# Patient Record
Sex: FEMALE | Race: WHITE | NOT HISPANIC OR LATINO
[De-identification: names, ages, dates, MRNs, and addresses within clinical notes are randomized per-mention and may not be internally consistent; named-entity substitution may affect disease eponyms.]

---

## 2020-07-08 PROBLEM — Z00.00 ENCOUNTER FOR PREVENTIVE HEALTH EXAMINATION: Noted: 2020-07-08

## 2020-07-09 ENCOUNTER — APPOINTMENT (OUTPATIENT)
Dept: THORACIC SURGERY | Facility: CLINIC | Age: 45
End: 2020-07-09
Payer: COMMERCIAL

## 2020-07-09 VITALS
SYSTOLIC BLOOD PRESSURE: 118 MMHG | HEIGHT: 61 IN | DIASTOLIC BLOOD PRESSURE: 76 MMHG | BODY MASS INDEX: 27.38 KG/M2 | WEIGHT: 145 LBS | HEART RATE: 74 BPM | OXYGEN SATURATION: 99 %

## 2020-07-09 DIAGNOSIS — K29.70 GASTRITIS, UNSPECIFIED, W/OUT BLEEDING: ICD-10-CM

## 2020-07-09 DIAGNOSIS — Z82.49 FAMILY HISTORY OF ISCHEMIC HEART DISEASE AND OTHER DISEASES OF THE CIRCULATORY SYSTEM: ICD-10-CM

## 2020-07-09 DIAGNOSIS — Z87.19 PERSONAL HISTORY OF OTHER DISEASES OF THE DIGESTIVE SYSTEM: ICD-10-CM

## 2020-07-09 PROCEDURE — 99205 OFFICE O/P NEW HI 60 MIN: CPT

## 2020-07-09 NOTE — HISTORY OF PRESENT ILLNESS
[FreeTextEntry1] : 44 y/o F with no significant pmhx who presents to our office for surgical evaluation of hiatal hernia repair. \par \par She initially presented to her ENT last month with cc of burning in throat and globus sensation in throat.  She states she remembers the moment it began on June 1st after swallowing a vitamin.  During her ENT visit she underwent laryngoscopy and was told she had LPR.  She was referred to GI for reflux managementt.  Her first GI physician had patient undergo  a barium esophagram which had normal results.  SHe followed up with a 2nd opinion and this physician performed an endoscopy on 6/26.  The endoscopy demonstrated a small hiatal hernia and some gastritis without signs of reflux and H.pylori was negative.  SHe was started on nexium and sucralfate at that time and has been on this medication for 2 weeks stating that it does not help at all. \par \par Patient sleeps sitting upright and is unable to lay flat without symptoms.  She also admits to some SOB,decreased appetite, weight loss (20 lbs over last month) and early satiety.  She denies dysphagia or difficulty swallowing.  She does complain of globus

## 2020-07-09 NOTE — PHYSICAL EXAM
[General Appearance - In No Acute Distress] : in no acute distress [General Appearance - Alert] : alert [Sclera] : the sclera and conjunctiva were normal [PERRL With Normal Accommodation] : pupils were equal in size, round, and reactive to light [Outer Ear] : the ears and nose were normal in appearance [Extraocular Movements] : extraocular movements were intact [Neck Cervical Mass (___cm)] : no neck mass was observed [Neck Appearance] : the appearance of the neck was normal [Oropharynx] : the oropharynx was normal [Thyroid Nodule] : there were no palpable thyroid nodules [Thyroid Diffuse Enlargement] : the thyroid was not enlarged [Jugular Venous Distention Increased] : there was no jugular-venous distention [Auscultation Breath Sounds / Voice Sounds] : lungs were clear to auscultation bilaterally [Heart Rate And Rhythm] : heart rate was normal and rhythm regular [Heart Sounds] : normal S1 and S2 [Murmurs] : no murmurs [Heart Sounds Gallop] : no gallops [Chest Visual Inspection Thoracic Asymmetry] : no chest asymmetry [Examination Of The Chest] : the chest was normal in appearance [Heart Sounds Pericardial Friction Rub] : no pericardial rub [Diminished Respiratory Excursion] : normal chest expansion [Abdomen Soft] : soft [Bowel Sounds] : normal bowel sounds [Abdomen Mass (___ Cm)] : no abdominal mass palpated [Abdomen Tenderness] : non-tender [Cervical Lymph Nodes Enlarged Posterior Bilaterally] : posterior cervical [Cervical Lymph Nodes Enlarged Anterior Bilaterally] : anterior cervical [Supraclavicular Lymph Nodes Enlarged Bilaterally] : supraclavicular [Axillary Lymph Nodes Enlarged Bilaterally] : axillary [Inguinal Lymph Nodes Enlarged Bilaterally] : inguinal [Femoral Lymph Nodes Enlarged Bilaterally] : femoral [No Spinal Tenderness] : no spinal tenderness [No CVA Tenderness] : no ~M costovertebral angle tenderness [Nail Clubbing] : no clubbing  or cyanosis of the fingernails [Abnormal Walk] : normal gait [Musculoskeletal - Swelling] : no joint swelling seen [Motor Tone] : muscle strength and tone were normal [Skin Color & Pigmentation] : normal skin color and pigmentation [Deep Tendon Reflexes (DTR)] : deep tendon reflexes were 2+ and symmetric [] : no rash [Skin Turgor] : normal skin turgor [No Focal Deficits] : no focal deficits [Sensation] : the sensory exam was normal to light touch and pinprick [Oriented To Time, Place, And Person] : oriented to person, place, and time [Affect] : the affect was normal [Impaired Insight] : insight and judgment were intact

## 2020-07-09 NOTE — CONSULT LETTER
[Dear  ___] : Dear  [unfilled], [Referral Letter:] : I am referring [unfilled] to you for further evaluation.  My most recent evaluation follows. [Consult Closing:] : Thank you very much for allowing me to participate in the care of this patient.  If you have any questions, please do not hesitate to contact me. [Please see my note below.] : Please see my note below. [Sincerely,] : Sincerely, [FreeTextEntry3] : Jona Chavez MD\par Thoracic Surgery\par [facility]

## 2020-07-09 NOTE — ASSESSMENT
[FreeTextEntry1] : 44 y/o F with 1 month history of significant globus sensation, throat burning, decreased appetite & 20 lb weight loss.  Both her endoscopy & barium esophagram demonstrate little indication for surgery however patient continues to complain of severe symptoms.  She has been instructed to undergo a Bravo pH study to help quantify her reflux.  We will also obtain the results of her initially laryngoscopy with Dr. Randolph (ENT).  Patient will need to return to our office to discuss these results and determine further plan for treatment going forward.

## 2020-07-09 NOTE — DATA REVIEWED
[FreeTextEntry1] : Barium Esophagram 6/19/2020: \par normal swallowing mechanism with normal primary peristaltic wave.  There is no esophageal mass or stricture.  No gastroesophageal reflux was visualized.  Limited examination of the stomach demonstrated no gross abnormality.  The barium pill passes promptly into the stomach without any abnormal delay.  \par \par Upper Endoscopy 6/26/20:\par Small hiatal hernia present.  Esophagus normal.  Mild inflammation characterized by erythema, friability and granularity was found in the gastric body and antrum. \par \par Pathology 6/26/20:  \par 1. small bowel: no significant histopathologic changes\par 2. gastri antrum:  mild reactive gastropathy. negative for H. Pylori,\par 3. gastric body: chronic gastritis\par 4. Esophagus:  no significant histopathologic changes\par

## 2020-07-15 ENCOUNTER — APPOINTMENT (OUTPATIENT)
Dept: GASTROENTEROLOGY | Facility: CLINIC | Age: 45
End: 2020-07-15
Payer: COMMERCIAL

## 2020-07-15 VITALS
HEART RATE: 97 BPM | HEIGHT: 61 IN | SYSTOLIC BLOOD PRESSURE: 108 MMHG | BODY MASS INDEX: 25.11 KG/M2 | DIASTOLIC BLOOD PRESSURE: 70 MMHG | WEIGHT: 133 LBS

## 2020-07-15 PROCEDURE — 99244 OFF/OP CNSLTJ NEW/EST MOD 40: CPT

## 2020-07-15 NOTE — ASSESSMENT
[FreeTextEntry1] : LPR with ?GERD- prior EGD and esophagram without clear evidence of GERD. Recommend EGD with Bravo pH capsule study to evaluate for acid reflux and association with symptoms. \par Risks/benefits/alternatives including not doing procedure discussed with patient. \par She understands and agrees to EGD with Bravo pH capsule placement.

## 2020-07-15 NOTE — PHYSICAL EXAM
[General Appearance - Alert] : alert [General Appearance - In No Acute Distress] : in no acute distress [Sclera] : the sclera and conjunctiva were normal [Outer Ear] : the ears and nose were normal in appearance [Hearing Threshold Finger Rub Not [place]] : hearing was normal [Neck Appearance] : the appearance of the neck was normal [] : no respiratory distress [Apical Impulse] : the apical impulse was normal [Abdomen Soft] : soft [Abdomen Tenderness] : non-tender [No CVA Tenderness] : no ~M costovertebral angle tenderness [Abnormal Walk] : normal gait [Skin Color & Pigmentation] : normal skin color and pigmentation [Cranial Nerves] : cranial nerves 2-12 were intact [Oriented To Time, Place, And Person] : oriented to person, place, and time [FreeTextEntry1] : deferred

## 2020-07-15 NOTE — HISTORY OF PRESENT ILLNESS
[FreeTextEntry1] : 46 yo f PMH ectopic pregnancy s/p removal of fallopian tube, seen at the request of Dr. Chavez for the evaluation of GERD. pH testing requested as part of evaluation prior to antireflux surgery. \par \par She saw ENT 1-2 months ago for throat burning and globus sensation which started acutely on June 1 after swallowing a vitamin.Patient has been seeing ENT for years and has apparently had LPR on prior evaluations as well. \par She has regurgitation and reflux after every meal as well as nausea. no vomiting. \par She was referred to GI. \par GI physician Dr. Knox, ordered esophagram which was normal and no GERD demonstrated. Barium pill promptly passed in to the stomach. \par  Dr. Hogan,  performed EGD on 6/26. small HH, gastritis, no signs of reflux, hp negative. , no EoE\par She started Nexium and sucralfate which has not been helpful at all with her symptoms. \par symptoms are worse when supine, she sleeps upright. \par She has some decreased appetite/early satiety and 20 lbs weight loss in last 1 -2 months. \par no dysphagia/odynophagia. \par + globus\par \par no abd pain. \par she denies NSAID use\par she is eating a bland diet\par she had recent labs in June with PMD, no anemia, cmet normal. \par no prior colonoscopy

## 2020-07-26 ENCOUNTER — RESULT REVIEW (OUTPATIENT)
Age: 45
End: 2020-07-26

## 2020-07-28 ENCOUNTER — RESULT REVIEW (OUTPATIENT)
Age: 45
End: 2020-07-28

## 2020-07-28 ENCOUNTER — APPOINTMENT (OUTPATIENT)
Dept: GASTROENTEROLOGY | Facility: HOSPITAL | Age: 45
End: 2020-07-28

## 2020-07-29 ENCOUNTER — RESULT REVIEW (OUTPATIENT)
Age: 45
End: 2020-07-29

## 2020-08-01 ENCOUNTER — RESULT REVIEW (OUTPATIENT)
Age: 45
End: 2020-08-01

## 2020-08-03 ENCOUNTER — APPOINTMENT (OUTPATIENT)
Dept: GASTROENTEROLOGY | Facility: HOSPITAL | Age: 45
End: 2020-08-03

## 2020-08-03 ENCOUNTER — RESULT REVIEW (OUTPATIENT)
Age: 45
End: 2020-08-03

## 2020-08-17 ENCOUNTER — TRANSCRIPTION ENCOUNTER (OUTPATIENT)
Age: 45
End: 2020-08-17

## 2020-10-22 ENCOUNTER — APPOINTMENT (OUTPATIENT)
Dept: THORACIC SURGERY | Facility: CLINIC | Age: 45
End: 2020-10-22
Payer: COMMERCIAL

## 2020-10-22 PROCEDURE — 99214 OFFICE O/P EST MOD 30 MIN: CPT

## 2020-10-22 NOTE — REVIEW OF SYSTEMS
[Shortness Of Breath] : shortness of breath [As Noted in HPI] : as noted in HPI [Heartburn] : heartburn [Negative] : Heme/Lymph

## 2020-10-23 NOTE — PHYSICAL EXAM
[Sclera] : the sclera and conjunctiva were normal [PERRL With Normal Accommodation] : pupils were equal in size, round, and reactive to light [Extraocular Movements] : extraocular movements were intact [Neck Appearance] : the appearance of the neck was normal [Neck Cervical Mass (___cm)] : no neck mass was observed [Jugular Venous Distention Increased] : there was no jugular-venous distention [Thyroid Diffuse Enlargement] : the thyroid was not enlarged [Thyroid Nodule] : there were no palpable thyroid nodules [Auscultation Breath Sounds / Voice Sounds] : lungs were clear to auscultation bilaterally [Heart Rate And Rhythm] : heart rate was normal and rhythm regular [Heart Sounds] : normal S1 and S2 [Heart Sounds Gallop] : no gallops [Murmurs] : no murmurs [Heart Sounds Pericardial Friction Rub] : no pericardial rub [Examination Of The Chest] : the chest was normal in appearance [Chest Visual Inspection Thoracic Asymmetry] : no chest asymmetry [Diminished Respiratory Excursion] : normal chest expansion [Bowel Sounds] : normal bowel sounds [Abdomen Soft] : soft [Abdomen Tenderness] : non-tender [Abdomen Mass (___ Cm)] : no abdominal mass palpated [Cervical Lymph Nodes Enlarged Posterior Bilaterally] : posterior cervical [Cervical Lymph Nodes Enlarged Anterior Bilaterally] : anterior cervical [Supraclavicular Lymph Nodes Enlarged Bilaterally] : supraclavicular [Axillary Lymph Nodes Enlarged Bilaterally] : axillary [Femoral Lymph Nodes Enlarged Bilaterally] : femoral [Inguinal Lymph Nodes Enlarged Bilaterally] : inguinal [No CVA Tenderness] : no ~M costovertebral angle tenderness [No Spinal Tenderness] : no spinal tenderness [Abnormal Walk] : normal gait [Nail Clubbing] : no clubbing  or cyanosis of the fingernails [Musculoskeletal - Swelling] : no joint swelling seen [Motor Tone] : muscle strength and tone were normal [Skin Color & Pigmentation] : normal skin color and pigmentation [Skin Turgor] : normal skin turgor [] : no rash [Deep Tendon Reflexes (DTR)] : deep tendon reflexes were 2+ and symmetric [Sensation] : the sensory exam was normal to light touch and pinprick [No Focal Deficits] : no focal deficits [Oriented To Time, Place, And Person] : oriented to person, place, and time [Impaired Insight] : insight and judgment were intact [Affect] : the affect was normal

## 2020-10-28 NOTE — HISTORY OF PRESENT ILLNESS
[FreeTextEntry1] : 44 y/o F with no significant pmhx who presents to our office for follow up appointment for surgical evaluation of hiatal hernia repair. \par \par She initially presented to our office in July after being diagnosed with LPR during laryngoscopy.  She complained of burning in throat and globus sensation in throat.  Her first GI physician had patient undergo a barium esophagram which had normal results. SHe followed up with a 2nd opinion and this physician performed an endoscopy on 6/26. The endoscopy demonstrated a small hiatal hernia and some gastritis without signs of reflux and H.pylori was negative. SHe was started on nexium and sucralfate at that time and states that it does not help at all. \par \par Patient sleeps sitting upright and is unable to lay flat without symptoms. She also admits to some SOB,decreased appetite, weight loss (30 lbs over last few month) and early satiety. She denies dysphagia or difficulty swallowing. \par \par After her initial consultation with us in June, she was referred for Bravo pH study to quantify her reflux.  She  was instructed to follow up in our office with these results and the results of the initial laryngoscopy with Dr. Randolph.

## 2020-10-28 NOTE — CONSULT LETTER
[Dear  ___] : Dear  [unfilled], [Consult Letter:] : I had the pleasure of evaluating your patient, [unfilled]. [Please see my note below.] : Please see my note below. [Consult Closing:] : Thank you very much for allowing me to participate in the care of this patient.  If you have any questions, please do not hesitate to contact me. [Sincerely,] : Sincerely, [DrJoelle  ___] : Dr. ELIZALDE [FreeTextEntry3] : Jona Chavez MD\par Thoracic Surgery\par [facility]

## 2020-10-28 NOTE — ASSESSMENT
[FreeTextEntry1] : 46 y/o F with globus sensation and complaints of GERD symptoms.  Patient has had complete workup and there is no significant correlation between her symptoms and pH changes.  She does have a hiatal hernia however it is small in size and unlikely contributing to her symptoms. Patient has been instructed to take low-dose amitriptyline for the next 8 weeks.  She will follow up with our office if her symptoms persist.  She expresses understanding and agrees to the plan.

## 2020-12-03 ENCOUNTER — APPOINTMENT (OUTPATIENT)
Dept: THORACIC SURGERY | Facility: CLINIC | Age: 45
End: 2020-12-03
Payer: COMMERCIAL

## 2020-12-03 PROCEDURE — 99214 OFFICE O/P EST MOD 30 MIN: CPT

## 2020-12-03 PROCEDURE — 99072 ADDL SUPL MATRL&STAF TM PHE: CPT

## 2020-12-08 NOTE — CONSULT LETTER
[Dear  ___] : Dear  [unfilled], [Consult Letter:] : I had the pleasure of evaluating your patient, [unfilled]. [Please see my note below.] : Please see my note below. [Consult Closing:] : Thank you very much for allowing me to participate in the care of this patient.  If you have any questions, please do not hesitate to contact me. [Sincerely,] : Sincerely, [DrJoelle  ___] : Dr. ELIZALDE [FreeTextEntry3] : Jona Chavez MD\par Thoracic Surgery \par [facility]

## 2020-12-08 NOTE — DATA REVIEWED
[FreeTextEntry1] : Barium Esophagram 6/19/2020: \par normal swallowing mechanism with normal primary peristaltic wave. There is no esophageal mass or stricture. No gastroesophageal reflux was visualized. Limited examination of the stomach demonstrated no gross abnormality. The barium pill passes promptly into the stomach without any abnormal delay. \par \par Upper Endoscopy 6/26/20:\par Small hiatal hernia present. Esophagus normal. Mild inflammation characterized by erythema, friability and granularity was found in the gastric body and antrum. \par \par Pathology 6/26/20: \par 1. small bowel: no significant histopathologic changes\par 2. gastri antrum: mild reactive gastropathy. negative for H. Pylori,\par 3. gastric body: chronic gastritis\par 4. Esophagus: no significant histopathologic changes\par \par BRAVO pH: 7/28/2020\par DeMaester score 4.9 (normal. These results indicated good acid control. 27 episodes of regurgitation were recorded, 9 episodes of cough, 5 episodes of belching. The symptom index and symptom association probability were both negative for an associateion with acid reflux and regurgitation or belching. The SAP was >95% on day 1 but not on day 2 suggesting a possible association of reflux and cough\par \par Direct laryngoscopy: possible mild exacerbation of LPR/GERD. Most likely irritation after vitamin dissolved in the proximal esophagus.\par \par 8/4/20 abdominal ultrasound: normal

## 2020-12-08 NOTE — ASSESSMENT
[FreeTextEntry1] : 44 y/o F with globus sensation and complaints of GERD symptoms. Patient has had complete workup at this time without significant correlation between her symptoms and pH changes. She does have a hiatal hernia however it is small in size and unlikely contributing to her symptoms. Although her symptoms did not improve with medical management, it is unlikely that her symptoms will improve with surgical intervention as well.  She has been instructed to follow up with Dr. Alvarez for further management of her globus sensation & LPR.  She expresses understanding and agrees to the plan. \par \par NOE, Monica Maguire, am scribing for and in the presence of Dr. Jona Chavez, the following sections: History of Present Illness, Past Medical / Family / Social History, Review of Systems, Vital Signs, Physical Exam, Disposition\par

## 2020-12-08 NOTE — HISTORY OF PRESENT ILLNESS
[FreeTextEntry1] : 44 y/o F with no significant pmhx who presents to our office for follow up appointment for surgical evaluation of hiatal hernia repair. \par \par She initially presented to our office in July after being diagnosed with LPR during laryngoscopy. She complained of burning in throat and globus sensation in throat. Her first GI physician had patient undergo a barium esophagram which had normal results. SHe followed up with a 2nd opinion and this physician performed an endoscopy on 6/26. The endoscopy demonstrated a small hiatal hernia and some gastritis without signs of reflux and H.pylori was negative. SHe was started on nexium and sucralfate at that time and states that it does not help at all. \par \par Patient sleeps sitting upright and is unable to lay flat without symptoms. She also admits to some SOB,decreased appetite, weight loss (30 lbs over last few month) and early satiety. She denies dysphagia or difficulty swallowing. \par \par After her initial consultation with us in June, she was referred for Bravo pH study to quantify her reflux. She was instructed to follow up in our office with these results and the results of the initial laryngoscopy with Dr. Randolph. \par \par She represented for follow up in October.  She had complete workup which demonstrated no significant correlation between her symptoms and pH changes. At that time, patient was instructed to take low-dose amitriptyline and to follow up with our office if her symptoms persist.\par \par She now presents for additional follow up , stating that the amitriptyline did not help her symptoms at all. She is requesting surgical intervention and repair of her small hiatal hernia at this time.

## 2020-12-28 ENCOUNTER — APPOINTMENT (OUTPATIENT)
Dept: OTOLARYNGOLOGY | Facility: CLINIC | Age: 45
End: 2020-12-28
Payer: COMMERCIAL

## 2020-12-28 VITALS — HEIGHT: 61 IN | TEMPERATURE: 98.2 F | WEIGHT: 109 LBS | BODY MASS INDEX: 20.58 KG/M2

## 2020-12-28 DIAGNOSIS — Z78.9 OTHER SPECIFIED HEALTH STATUS: ICD-10-CM

## 2020-12-28 DIAGNOSIS — Z83.3 FAMILY HISTORY OF DIABETES MELLITUS: ICD-10-CM

## 2020-12-28 DIAGNOSIS — Z86.39 PERSONAL HISTORY OF OTHER ENDOCRINE, NUTRITIONAL AND METABOLIC DISEASE: ICD-10-CM

## 2020-12-28 DIAGNOSIS — K21.9 GASTRO-ESOPHAGEAL REFLUX DISEASE W/OUT ESOPHAGITIS: ICD-10-CM

## 2020-12-28 DIAGNOSIS — R09.89 OTHER SPECIFIED SYMPTOMS AND SIGNS INVOLVING THE CIRCULATORY AND RESPIRATORY SYSTEMS: ICD-10-CM

## 2020-12-28 DIAGNOSIS — Z82.49 FAMILY HISTORY OF ISCHEMIC HEART DISEASE AND OTHER DISEASES OF THE CIRCULATORY SYSTEM: ICD-10-CM

## 2020-12-28 DIAGNOSIS — Z86.79 PERSONAL HISTORY OF OTHER DISEASES OF THE CIRCULATORY SYSTEM: ICD-10-CM

## 2020-12-28 DIAGNOSIS — Z87.19 PERSONAL HISTORY OF OTHER DISEASES OF THE DIGESTIVE SYSTEM: ICD-10-CM

## 2020-12-28 PROCEDURE — 99072 ADDL SUPL MATRL&STAF TM PHE: CPT

## 2020-12-28 PROCEDURE — 31575 DIAGNOSTIC LARYNGOSCOPY: CPT

## 2020-12-28 PROCEDURE — 99203 OFFICE O/P NEW LOW 30 MIN: CPT | Mod: 25

## 2020-12-28 RX ORDER — SUCRALFATE 1 G/1
1 TABLET ORAL
Refills: 0 | Status: COMPLETED | COMMUNITY
End: 2020-12-28

## 2020-12-28 RX ORDER — FAMOTIDINE 20 MG/1
20 TABLET, FILM COATED ORAL
Qty: 30 | Refills: 0 | Status: ACTIVE | COMMUNITY
Start: 2020-12-28 | End: 1900-01-01

## 2020-12-28 RX ORDER — OMEPRAZOLE 40 MG/1
40 CAPSULE, DELAYED RELEASE ORAL
Qty: 30 | Refills: 1 | Status: ACTIVE | COMMUNITY
Start: 2020-12-28 | End: 1900-01-01

## 2020-12-28 RX ORDER — OMEPRAZOLE 40 MG/1
40 CAPSULE, DELAYED RELEASE ORAL
Qty: 30 | Refills: 0 | Status: COMPLETED | COMMUNITY
Start: 2020-08-20

## 2020-12-28 RX ORDER — ESOMEPRAZOLE MAGNESIUM 40 MG/1
40 CAPSULE, DELAYED RELEASE ORAL
Refills: 0 | Status: COMPLETED | COMMUNITY
End: 2020-12-28

## 2020-12-28 RX ORDER — AMITRIPTYLINE HYDROCHLORIDE 10 MG/1
10 TABLET, FILM COATED ORAL
Qty: 200 | Refills: 0 | Status: ACTIVE | COMMUNITY
Start: 2020-10-13

## 2020-12-29 NOTE — REASON FOR VISIT
[Initial Evaluation] : an initial evaluation for [Gastroesophageal Reflux] : gastroesophageal reflux

## 2020-12-29 NOTE — CONSULT LETTER
[Dear  ___] : Dear  [unfilled], [Consult Letter:] : I had the pleasure of evaluating your patient, [unfilled]. [Please see my note below.] : Please see my note below. [Consult Closing:] : Thank you very much for allowing me to participate in the care of this patient.  If you have any questions, please do not hesitate to contact me. [Sincerely,] : Sincerely, [FreeTextEntry3] : Cheli De Dios MD\par

## 2020-12-29 NOTE — ASSESSMENT
[FreeTextEntry1] : She has had a six-month history of a globus sensation with phlegm, throat irritation, and weight loss. Her symptoms suggest reflux. She has had an extensive workup. [employer] ph probe testing was positive but Bravo  pH probe testing was negative. On flexible laryngoscopy today, she does have reflux-related laryngeal changes. She has tried multiple medications without improvement\par \par PLAN\par \par -findings and management options discussed in detail with the patient. \par - Hydration, humidification  \par - Saltwater gargles.  The patient was asked to avoid alcohol based mouthwashes\par - Reflux precautions and elevation of the head of bed at night or wedge pillow (she has been sleeping upright).  Literature given.\par - literature regarding postnasal drip given\par - I recommended evaluation with a nutritionist to help her with her diet\par - We discussed trying medication again. She will take omeprazole in the morning and pepcid at night for 4 weeks. \par - She will followup with the gastroenterologist to see if she is a candidate for any procedures\par - follow up in 3-4 weeks\par - call and return earlier if any concerns

## 2020-12-29 NOTE — HISTORY OF PRESENT ILLNESS
[de-identified] : LUIS FAULKNER is a 45 year patient With throat mucous, throat irritation, globus sensation, and weight loss since June. She did not feel sick when this started. She has no nasal symptoms, allergies, or history of smoking. She has been diagnosed with reflux. She has tried medication for reflux including PPIs and sulcrafate although she said she has not taken the  medication for more than 2 weeks at a time. She has seen several ENTs and gastroenterologists. RSI was 26. She has had multiple tests performed. She is considering surgery for the reflux. She has also tried amitriptyline which has not helped. \par \par 10/2020-[employer] ph probe testing- Evangelista score- 32.12. positive\par 6/2020- videoesophagram- normal swallowing mechanism. no reflux, no masses. \par 6/2020- upper endoscopy- small hiatal hernia, gastritis\par 8/2020- ph probe- reportedly normal

## 2021-01-25 ENCOUNTER — APPOINTMENT (OUTPATIENT)
Dept: OTOLARYNGOLOGY | Facility: CLINIC | Age: 46
End: 2021-01-25

## 2022-03-08 ENCOUNTER — NON-APPOINTMENT (OUTPATIENT)
Age: 47
End: 2022-03-08

## 2024-04-04 NOTE — END OF VISIT
[Time Spent: ___ minutes] : I have spent [unfilled] minutes of time on the encounter. [>50% of the face to face encounter time was spent on counseling and/or coordination of care for ___] : Greater than 50% of the face to face encounter time was spent on counseling and/or coordination of care for [unfilled] Rehabilitation services/Teaching and training